# Patient Record
Sex: FEMALE | Race: WHITE | NOT HISPANIC OR LATINO | Employment: OTHER
[De-identification: names, ages, dates, MRNs, and addresses within clinical notes are randomized per-mention and may not be internally consistent; named-entity substitution may affect disease eponyms.]

---

## 2018-04-26 ENCOUNTER — RECORDS - HEALTHEAST (OUTPATIENT)
Dept: ADMINISTRATIVE | Facility: OTHER | Age: 78
End: 2018-04-26

## 2018-04-26 LAB
LAB AP CHARGES (HE HISTORICAL CONVERSION): NORMAL
PATH REPORT.COMMENTS IMP SPEC: NORMAL
PATH REPORT.FINAL DX SPEC: NORMAL
PATH REPORT.GROSS SPEC: NORMAL
PATH REPORT.MICROSCOPIC SPEC OTHER STN: NORMAL
PATH REPORT.RELEVANT HX SPEC: NORMAL
RESULT FLAG (HE HISTORICAL CONVERSION): NORMAL

## 2023-01-29 ENCOUNTER — HOSPITAL ENCOUNTER (EMERGENCY)
Facility: CLINIC | Age: 83
Discharge: HOME | End: 2023-01-29
Attending: EMERGENCY MEDICINE | Admitting: EMERGENCY MEDICINE
Payer: MEDICARE

## 2023-01-29 VITALS
BODY MASS INDEX: 38.64 KG/M2 | SYSTOLIC BLOOD PRESSURE: 158 MMHG | DIASTOLIC BLOOD PRESSURE: 74 MMHG | WEIGHT: 210 LBS | HEIGHT: 62 IN | OXYGEN SATURATION: 96 % | HEART RATE: 69 BPM | RESPIRATION RATE: 16 BRPM | TEMPERATURE: 98.9 F

## 2023-01-29 DIAGNOSIS — T81.41XA INFECTION OF SUPERFICIAL INCISIONAL SURGICAL SITE AFTER PROCEDURE, INITIAL ENCOUNTER: ICD-10-CM

## 2023-01-29 PROCEDURE — 99283 EMERGENCY DEPT VISIT LOW MDM: CPT

## 2023-01-29 PROCEDURE — 250N000009 HC RX 250: Performed by: EMERGENCY MEDICINE

## 2023-01-29 PROCEDURE — 87070 CULTURE OTHR SPECIMN AEROBIC: CPT | Performed by: EMERGENCY MEDICINE

## 2023-01-29 PROCEDURE — 250N000013 HC RX MED GY IP 250 OP 250 PS 637: Performed by: EMERGENCY MEDICINE

## 2023-01-29 RX ORDER — GINSENG 100 MG
CAPSULE ORAL ONCE
Status: COMPLETED | OUTPATIENT
Start: 2023-01-29 | End: 2023-01-29

## 2023-01-29 RX ORDER — CEPHALEXIN 500 MG/1
500 CAPSULE ORAL 4 TIMES DAILY
Qty: 28 CAPSULE | Refills: 0 | Status: SHIPPED | OUTPATIENT
Start: 2023-01-29 | End: 2023-02-05

## 2023-01-29 RX ORDER — CEPHALEXIN 500 MG/1
500 CAPSULE ORAL ONCE
Status: COMPLETED | OUTPATIENT
Start: 2023-01-29 | End: 2023-01-29

## 2023-01-29 RX ADMIN — CEPHALEXIN 500 MG: 500 CAPSULE ORAL at 17:22

## 2023-01-29 RX ADMIN — BACITRACIN: 500 OINTMENT TOPICAL at 17:26

## 2023-01-29 ASSESSMENT — ENCOUNTER SYMPTOMS
FEVER: 0
SHORTNESS OF BREATH: 0
WOUND: 1
VOMITING: 0
NAUSEA: 0

## 2023-01-29 NOTE — ED TRIAGE NOTES
The patient presents to the ED with right lower leg pain and a suspected wound. The patient reports she had a recent Mohs procedure last Tuesday. She reports increasing drainage, redness, swelling and tenderness since. She was sent here from [facility].

## 2023-01-29 NOTE — ED PROVIDER NOTES
Emergency Department Encounter     Evaluation Date & Time:   No admission date for patient encounter.    CHIEF COMPLAINT:  Leg Pain      Triage Note:The patient presents to the ED with right lower leg pain and a suspected wound. The patient reports she had a recent Mohs procedure last Tuesday. She reports increasing drainage, redness, swelling and tenderness since. She was sent here from [facility].           FINAL IMPRESSION:    ICD-10-CM    1. Infection of superficial incisional surgical site after procedure, initial encounter  T81.41XA           Impression and Plan     ED COURSE & MEDICAL DECISION MAKING:        ED Course as of 01/29/23 1724   Sun Jan 29, 2023   1650 Due to a shortage of available emergency department rooms, with the patient's permission I met with the patient for the initial interview and physical examination in a triage room. Discussed plan for treatment and workup in the ED.         1707 Patient does have signs of a localized infection on her right anterior shin at her postsurgical site.  She has no history of resistant infections according to the patient.  There is no diffuse swelling no involvement of the calf to suggest that this is a DVT, and additionally I do not feel any fluctuance, there is no pus coming from the wound which appears dry so no indication for ultrasound imaging.  The wound itself is dry so wound culture would be unhelpful.  She is not running a fever so blood cultures as well will have extremely low yield.  We will treat based on symptoms and the most likely cause of postsurgical cellulitis.  She has no evidence for lymphangitic spread and no systemic signs of illness to necessitate further blood work or admission to the hospital.  We did discuss care of this lower extremity cellulitis at home in particular the need to keep the leg elevated as much as possible above the level of her heart if able on a couch or otherwise.  She and her son are both comfortable  with the plan.  They should follow-up with the dermatologist who did the procedure in about 3 days and if that dermatologist is not able to see her for her postoperative complication then she should set up an appointment with her own primary care clinic.  Certainly in the meantime if she worsens she will return here.   1723 I did prep the wound area with chloraprep and sent a wound culture from the little bit of serous fluid at the site.       At the conclusion of the encounter I discussed the results of all the tests and the disposition. The questions were answered. The patient or family acknowledged understanding and was agreeable with the care plan.          0 minutes of critical care time        MEDICATIONS GIVEN IN THE EMERGENCY DEPARTMENT:  Medications   cephALEXin (KEFLEX) capsule 500 mg (has no administration in time range)   bacitracin ointment (has no administration in time range)       NEW PRESCRIPTIONS STARTED AT TODAY'S ED VISIT:  New Prescriptions    CEPHALEXIN (KEFLEX) 500 MG CAPSULE    Take 1 capsule (500 mg) by mouth 4 times daily for 7 days       HPI     The history is provided by the patient. No  was used.        Karla Alvarado is a 82 year old female with a pertinent history of Recent surgical procedure on lower extremity who presents to this ED by private car for evaluation of leg pain.    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 Patient had a concern for infection and was evaluated by [facility] for this. [facility] confirmed infection of the right leg, but was unsure if the patient was in need of IV antibiotics so they instructed she report to th ED. Patient endorses pain and new redness to the infected area. She denies a past medical history of infection. Patient notes she does have allergies to certain medication. Patient is on nasal canula oxygen at baseline.    No fever, nausea, vomiting, or shortness of  breath. No other medical concerns are expressed at this time.     REVIEW OF SYSTEMS:  Review of Systems   Constitutional: Negative for fever.   Respiratory: Negative for shortness of breath.    Gastrointestinal: Negative for nausea and vomiting.   Skin: Positive for wound (right leg.).   All other systems reviewed and are negative.    remainder of systems are all otherwise negative.        Medical History     Past Medical History:   Diagnosis Date     Recent surgical procedure on lower extremity        History reviewed.  From her [facility] chart summary:  Type 2 diabetes mellitus with stage 3b chronic kidney disease, unspecified whether long term insulin use 01/13/2023   Coronary artery disease involving native coronary artery of native heart with other form of angina pectoris 01/13/2023   Malignant neoplasm of upper lobe of right lung 07/21/2021   Overview:     Formatting of this note might be different from the original.  Diagnosed in 06/2021. Not a surgical candidate. S/P radiation treatment.   Mood disorder 04/19/2021   Recurrent epistaxis 12/02/2020   NSTEMI (non-ST elevated myocardial infarction) 10/12/2020   COPD (chronic obstructive pulmonary disease) 10/07/2020   CAD (coronary artery disease) 10/07/2020   Overview:     Formatting of this note might be different from the original.  10/2020: S/P 2 vessel stenting   Stage 3b chronic kidney disease 10/07/2020   Chronic respiratory failure with hypoxia 05/20/2020   Other hyperlipidemia 04/30/2020   Hypertension 04/30/2020   Advance care planning 05/12/2019   Overview:     Formatting of this note might be different from the original.  Patient has identified Health Care Agent(s): Yes  Add Health Care Agents: No Her  Toño or daughter deepika  Patient has Advance Care Plan Documents (Health Care Directive, POLST): No, Health Care Packet given to patient.    Patient has identified Specific Treatment Preferences: Yes     How have preferences been verified:  "verbal    Specific Treatment Preferences:   a.) Code Status: CPR/Attempt Resuscitation Would not want left on if not a readily reversible situation Deepali Rojo D.O.   5/12/2019 10:52 PM        Right carotid bruit 09/04/2018   S/P coronary angiogram with PTCA to the previously deployed LAD stent; PTCA & placement of a drug-eluting stent to the RCA 04/04/2013   S/P coronary angiogram and angioplasty with JOEL to proximal LAD and distal circumflex/obtuse marginal branch.  02/08/2013   Pulmonary hypertension 07/28/2008   Overview:     Formatting of this note might be different from the original.  Moderatly severe.  Dr Hall.   Gastroesophageal reflux disease without esophagitis 07/23/2007   Overview:     Formatting of this note might be different from the original.  hx of   Acquired hypothyroidism 04/20/2007   Unspecified glaucoma 04/20/2007   Depressive disorder, not elsewhere classified 04/20/2007   Type II or unspecified type diabetes mellitus without mention of complication, not stated as uncontrolled     Overview:     Formatting of this note might be different from the original.  Diagnosed in 1990. On insulin now. No microvascular complications other than mild neuropathy symptoms.   BRADFORD (obstructive sleep apnea)          History reviewed. No pertinent family history.         cephALEXin (KEFLEX) 500 MG capsule        Physical Exam     First Vitals:  Patient Vitals for the past 24 hrs:   BP Temp Temp src Pulse Resp SpO2 Height Weight   01/29/23 1437 (!) 158/74 98.9  F (37.2  C) Temporal 69 16 96 % 1.575 m (5' 2\") 95.3 kg (210 lb)       PHYSICAL EXAM:   Constitutional:   Ambulatory around waiting room upon time of evaluation. Fully clothed.    HENT:  Normocephalic, posterior pharynx wnl, mucous membranes.   Eyes:  PERRL, EOMI, Conjunctiva normal, No discharge, no scleral icterus.  Respiratory:  Nasal canula oxygen.   Cardiovascular:  No nl s1s2 0 murmurs, rubs, or gallops.  Peripheral pulses dp, pt, and radial are " wnl.  No peripheral edema   GI:  Bowel sounds normal, Soft, No tenderness, No flank tenderness, nondistended.  :No CVA tenderness.   Musculoskeletal:  Moves all extremities.  No erythematous or swollen major joints,   Integument:  Right anterior skin post surgical site has unremarkable ecchymosis. No decadence, no fluctuance, some surrounding erythema that extends 1.5 cm from each side and goes distally 3/4 of wound. No streaking up the leg.   Lymphatic:  No cervical lymphadenopathy  Neurologic:  Alert & oriented x 3, Normal motor function, Normal sensory function, No focal deficits noted. Normal speech.  Psychiatric:  Affect normal, Judgment normal, Mood normal.             Results     LAB AND RADIOLOGY:  All pertinent labs reviewed and interpreted  No results found for any visits on 01/29/23.    PROCEDURES:  Procedures:       [employer] Documentation     Medical Decision Making    History:    Supplemental history from: Documented in chart, if applicable    External Record(s) reviewed: Documented in chart, if applicable.    Work Up:    Chart documentation includes differential considered and any EKGs or imaging independently interpreted by provider, where specified.    In additional to work up documented, I considered the following work up: Documented in chart, if applicable.    External consultation:    Discussion of management with another provider: Documented in chart, if applicable    Complicating factors:    Care impacted by chronic illness: Diabetes    Care affected by social determinants of health: N/A    Disposition considerations: Discharge. I prescribed additional prescription strength medication(s) as charted. Admission consideration documented above, if applicable.             The creation of this record is based on the scribe s observations of the work being performed by Sammie Nova and the provider s statements to them. This document has been checked and approved by Angeles Watters  MD Angeles Watters MD  Emergency Medicine  [facility] EMERGENCY ROOM         Angeles Watters MD  01/29/23 [id number]

## 2023-01-29 NOTE — DISCHARGE INSTRUCTIONS
If for some reason you are not able to get into your dermatologist who did the procedure, then make an appointment to see your primary care clinic in 3 days for wound recheck.    Keep the leg elevated as much as possible over the course of the next few days to give the antibiotics a chance to work.    Expect that in the next 24 hours the redness may be a bit worsened than it was today as the antibiotics start to kick in, but if you do notice that it is streaking up past your knee, you are running fevers, or you are otherwise feeling sick you should return to the emergency department for repeat evaluation.      I would also start using over-the-counter antibiotic ointment on the wound twice a day.  You can use either bacitracin or Neosporin.  Either 1 is fine.    Your next dose of the antibiotic should be about an hour before bedtime tonight and make sure to take it with a bit of food so that it does not upset your stomach

## 2023-01-31 LAB — BACTERIA WND CULT: ABNORMAL

## 2023-02-01 RX ORDER — SULFAMETHOXAZOLE/TRIMETHOPRIM 800-160 MG
1 TABLET ORAL 2 TIMES DAILY
Qty: 20 TABLET | Refills: 0 | Status: SHIPPED | OUTPATIENT
Start: 2023-02-01 | End: 2023-02-11

## 2023-02-19 ENCOUNTER — LAB REQUISITION (OUTPATIENT)
Dept: LAB | Facility: CLINIC | Age: 83
End: 2023-02-19
Payer: MEDICARE

## 2023-02-19 DIAGNOSIS — I48.91 UNSPECIFIED ATRIAL FIBRILLATION (H): ICD-10-CM

## 2023-02-20 LAB
ANION GAP SERPL CALCULATED.3IONS-SCNC: 10 MMOL/L (ref 7–15)
BUN SERPL-MCNC: 33.4 MG/DL (ref 8–23)
CALCIUM SERPL-MCNC: 9.1 MG/DL (ref 8.8–10.2)
CHLORIDE SERPL-SCNC: 103 MMOL/L (ref 98–107)
CREAT SERPL-MCNC: 1.24 MG/DL (ref 0.51–0.95)
DEPRECATED HCO3 PLAS-SCNC: 26 MMOL/L (ref 22–29)
ERYTHROCYTE [DISTWIDTH] IN BLOOD BY AUTOMATED COUNT: 16.8 % (ref 10–15)
GFR SERPL CREATININE-BSD FRML MDRD: 43 ML/MIN/1.73M2
GLUCOSE SERPL-MCNC: 113 MG/DL (ref 70–99)
HCT VFR BLD AUTO: 31.3 % (ref 35–47)
HGB BLD-MCNC: 9.3 G/DL (ref 11.7–15.7)
MCH RBC QN AUTO: 24.3 PG (ref 26.5–33)
MCHC RBC AUTO-ENTMCNC: 29.7 G/DL (ref 31.5–36.5)
MCV RBC AUTO: 82 FL (ref 78–100)
PLATELET # BLD AUTO: 234 10E3/UL (ref 150–450)
POTASSIUM SERPL-SCNC: 5.1 MMOL/L (ref 3.4–5.3)
RBC # BLD AUTO: 3.83 10E6/UL (ref 3.8–5.2)
SODIUM SERPL-SCNC: 139 MMOL/L (ref 136–145)
WBC # BLD AUTO: 6.9 10E3/UL (ref 4–11)

## 2023-02-20 PROCEDURE — 82310 ASSAY OF CALCIUM: CPT | Performed by: INTERNAL MEDICINE

## 2023-02-20 PROCEDURE — 85027 COMPLETE CBC AUTOMATED: CPT | Performed by: INTERNAL MEDICINE

## 2023-02-20 PROCEDURE — P9604 ONE-WAY ALLOW PRORATED TRIP: HCPCS | Mod: ORL | Performed by: INTERNAL MEDICINE

## 2023-02-20 PROCEDURE — 36415 COLL VENOUS BLD VENIPUNCTURE: CPT | Mod: ORL | Performed by: INTERNAL MEDICINE
